# Patient Record
Sex: FEMALE | Race: BLACK OR AFRICAN AMERICAN | Employment: UNEMPLOYED | URBAN - METROPOLITAN AREA
[De-identification: names, ages, dates, MRNs, and addresses within clinical notes are randomized per-mention and may not be internally consistent; named-entity substitution may affect disease eponyms.]

---

## 2017-11-03 ENCOUNTER — APPOINTMENT (OUTPATIENT)
Dept: GENERAL RADIOLOGY | Age: 62
End: 2017-11-03
Attending: EMERGENCY MEDICINE
Payer: SELF-PAY

## 2017-11-03 ENCOUNTER — HOSPITAL ENCOUNTER (EMERGENCY)
Age: 62
Discharge: HOME | End: 2017-11-03
Attending: EMERGENCY MEDICINE
Payer: SELF-PAY

## 2017-11-03 VITALS
SYSTOLIC BLOOD PRESSURE: 157 MMHG | HEART RATE: 86 BPM | TEMPERATURE: 98 F | BODY MASS INDEX: 35.36 KG/M2 | RESPIRATION RATE: 17 BRPM | OXYGEN SATURATION: 100 % | DIASTOLIC BLOOD PRESSURE: 95 MMHG | HEIGHT: 66 IN | WEIGHT: 220 LBS

## 2017-11-03 DIAGNOSIS — S60.211A CONTUSION OF RIGHT WRIST, INITIAL ENCOUNTER: ICD-10-CM

## 2017-11-03 DIAGNOSIS — R73.9 HYPERGLYCEMIA: Primary | ICD-10-CM

## 2017-11-03 LAB — GLUCOSE BLD STRIP.AUTO-MCNC: 213 MG/DL (ref 70–110)

## 2017-11-03 PROCEDURE — 75810000053 HC SPLINT APPLICATION

## 2017-11-03 PROCEDURE — 73130 X-RAY EXAM OF HAND: CPT

## 2017-11-03 PROCEDURE — 82962 GLUCOSE BLOOD TEST: CPT

## 2017-11-03 PROCEDURE — 73110 X-RAY EXAM OF WRIST: CPT

## 2017-11-03 PROCEDURE — 74011250637 HC RX REV CODE- 250/637: Performed by: EMERGENCY MEDICINE

## 2017-11-03 PROCEDURE — 99283 EMERGENCY DEPT VISIT LOW MDM: CPT

## 2017-11-03 RX ORDER — HYDROCODONE BITARTRATE AND ACETAMINOPHEN 5; 325 MG/1; MG/1
1 TABLET ORAL
Status: COMPLETED | OUTPATIENT
Start: 2017-11-03 | End: 2017-11-03

## 2017-11-03 RX ADMIN — HYDROCODONE BITARTRATE AND ACETAMINOPHEN 1 TABLET: 5; 325 TABLET ORAL at 14:51

## 2017-11-03 NOTE — ED NOTES
I have reviewed discharge instructions with the patient. The patient verbalized understanding. Splint care reviewed. Follow-up reviewed. Patient armband removed and shredded.

## 2017-11-03 NOTE — ED PROVIDER NOTES
HPI Comments: 12:50 PM Reena Kellogg is a 60 y.o. female with h/o HTN who presents to ED complaining of right hand swelling after a mechanical fall about a week a go. The pt states she tripped and fell catching her self with her right hand and knees, since her hand has been swollen with pain. The pt reports she recently moved to the area from [place] so she does not have a PCP. The pt denies smoking, HA, fever, numbness, and weakness. The pt had no other complaints or concerns in the ED. PCP: No primary care provider on file. The history is provided by the patient. No  was used. Past Medical History:   Diagnosis Date    Diabetes ([facility] [address]     Hypertension        History reviewed. No pertinent surgical history. History reviewed. No pertinent family history. Social History     Social History    Marital status:      Spouse name: N/A    Number of children: N/A    Years of education: N/A     Occupational History    Not on file. Social History Main Topics    Smoking status: Never Smoker    Smokeless tobacco: Never Used    Alcohol use Not on file    Drug use: Not on file    Sexual activity: Not on file     Other Topics Concern    Not on file     Social History Narrative    No narrative on file         ALLERGIES: Review of patient's allergies indicates not on file. Review of Systems   Constitutional: Negative for chills and fatigue. HENT: Negative. Negative for sore throat. Eyes: Negative. Respiratory: Negative for cough and shortness of breath. Cardiovascular: Negative for chest pain and palpitations. Genitourinary: Negative for dysuria. Musculoskeletal: Positive for arthralgias, joint swelling and myalgias. Skin: Negative. Neurological: Negative for dizziness, weakness and light-headedness. Psychiatric/Behavioral: Negative. All other systems reviewed and are negative.       Vitals:    11/03/17 1249   BP: (!) 157/95 Pulse: 86   Resp: 17   Temp: 98 °F (36.7 °C)   SpO2: 100%   Weight: 99.8 kg (220 lb)   Height: 5' 6\" (1.676 m)            Physical Exam   Constitutional: She is oriented to person, place, and time. She appears well-developed and well-nourished. No distress. HENT:   Head: Normocephalic and atraumatic. Mouth/Throat: Oropharynx is clear and moist.   Eyes: Conjunctivae and EOM are normal. Pupils are equal, round, and reactive to light. No scleral icterus. Neck: Normal range of motion. Neck supple. Cardiovascular: Normal rate, regular rhythm and normal heart sounds. No murmur heard. Pulmonary/Chest: Effort normal and breath sounds normal. No respiratory distress. Abdominal: Soft. Bowel sounds are normal. She exhibits no distension. There is no tenderness. Musculoskeletal: She exhibits no edema. Right wrist: She exhibits tenderness and swelling. Left hand: She exhibits tenderness. The pt has right wrist and hand edema with her being extremely tender over her wrist. The pt entire hand is edematous. The pt has decreased flexion and extension. Pt also has snuff box tenderness. Lymphadenopathy:     She has no cervical adenopathy. Neurological: She is alert and oriented to person, place, and time. Coordination normal.   Skin: Skin is warm and dry. No rash noted. Psychiatric: She has a normal mood and affect. Her behavior is normal.   Nursing note and vitals reviewed.        MDM  Number of Diagnoses or Management Options  Contusion of right wrist, initial encounter:   Hyperglycemia:   Diagnosis management comments: Mechanical fall onto outstretched hand with edema/tenderness wrist and hand X 1 week     Xray no apparent fracture however with snuff box tenderness will place in thumbspica splint referral to Dr Ravinder Ly and/or Complexity of Data Reviewed  Tests in the radiology section of CPT®: ordered and reviewed      ED Course       Splint, Thumb Gutter  Date/Time: 11/3/2017 2:52 PM  Performed by: Patty Lee  Authorized by: Patty Lee     Consent:     Consent obtained:  Verbal    Consent given by:  Patient    Risks discussed:  Pain    Alternatives discussed:  No treatment  Pre-procedure details:     Sensation:  Normal  Procedure details:     Laterality:  Right    Location:  Wrist    Wrist:  R wrist    Strapping: no      Splint type:  Thumb spica    Supplies:  Ortho-Glass  Post-procedure details:     Pain:  Improved    Sensation:  Normal    Patient tolerance of procedure: Tolerated well, no immediate complications  Comments:      capillary refill intact. Vitals:  Patient Vitals for the past 12 hrs:   Temp Pulse Resp BP SpO2   11/03/17 1249 98 °F (36.7 °C) 86 17 (!) 157/95 100 %       Medications Ordered:  Medications   HYDROcodone-acetaminophen (NORCO) 5-325 mg per tablet 1 Tab (1 Tab Oral Given 11/3/17 1451)       Lab Findings:  Recent Results (from the past 12 hour(s))   GLUCOSE, POC    Collection Time: 11/03/17 12:47 PM   Result Value Ref Range    Glucose (POC) 213 (H) 70 - 110 mg/dL       EKG Interpretation by ED physician:      X-ray, CT or radiology findings or impressions:  XR HAND RT MIN 3 V   Final Result      XR WRIST RT AP/LAT/OBL MIN 3V    (Results Pending)       Progress notes, consult notes, or additional procedure notes:      Reevaluation of the patient:       Diagnosis:   1. Hyperglycemia    2.  Contusion of right wrist, initial encounter        Disposition: home    Follow-up Information     Follow up With Details Comments Contact formerly [facility] EMERGENCY DEPT  As needed, If symptoms worsen [address] [place] [address] [address] [facility]  for ED medical follow up appointment  [address] [facility] [address]     Rozina Pittman MD Schedule an appointment as soon as possible for a visit for ED follow up appointment  [address]  [address] [place] [address] Patient's Medications    No medications on file       Scribe Luh Hernandez acting as a scribe for and in the presence of Gildardo Valladares MD      November 03, 2017 at 12:57 PM       Provider Attestation:      I personally performed the services described in the documentation, reviewed the documentation, as recorded by the scribe in my presence, and it accurately and completely records my words and actions.  November 03, 2017 at 12:57 PM - Gildardo Valladares MD

## 2017-11-03 NOTE — ED TRIAGE NOTES
Dewanda Flurry one week ago catching self with right arm and knees. Right hand swollen and elbow and wrist pain.  Bilateral knee abrasions

## 2018-09-11 ENCOUNTER — HOSPITAL ENCOUNTER (EMERGENCY)
Age: 63
Discharge: ANOTHER HOSPITAL | End: 2018-09-11
Attending: EMERGENCY MEDICINE
Payer: SELF-PAY

## 2018-09-11 ENCOUNTER — APPOINTMENT (OUTPATIENT)
Dept: CT IMAGING | Age: 63
End: 2018-09-11
Attending: PHYSICIAN ASSISTANT
Payer: SELF-PAY

## 2018-09-11 VITALS
WEIGHT: 170 LBS | HEIGHT: 69 IN | TEMPERATURE: 98.1 F | RESPIRATION RATE: 16 BRPM | HEART RATE: 85 BPM | SYSTOLIC BLOOD PRESSURE: 145 MMHG | OXYGEN SATURATION: 98 % | DIASTOLIC BLOOD PRESSURE: 83 MMHG | BODY MASS INDEX: 25.18 KG/M2

## 2018-09-11 DIAGNOSIS — H53.131 ACUTE LOSS OF VISION, RIGHT: Primary | ICD-10-CM

## 2018-09-11 PROCEDURE — 99283 EMERGENCY DEPT VISIT LOW MDM: CPT

## 2018-09-11 PROCEDURE — 70450 CT HEAD/BRAIN W/O DYE: CPT

## 2018-09-11 RX ORDER — METFORMIN HYDROCHLORIDE 500 MG/1
TABLET ORAL 2 TIMES DAILY WITH MEALS
COMMUNITY

## 2018-09-11 NOTE — ED PROVIDER NOTES
EMERGENCY DEPARTMENT HISTORY AND PHYSICAL EXAM 
 
4:37 PM 
 
 
Date: 9/11/2018 Patient Name: Paola Diaz History of Presenting Illness Chief Complaint Patient presents with  Visual Problems History Provided By: Patient Chief Complaint: floating spots in eyes Duration:  Weeks Timing:  Constant Location: both eyes Quality: n/a Severity: Mild Modifying Factors: Nothing makes it worse or better Associated Symptoms: denies any other associated signs or symptoms Additional History (Context): Paola Diaz is a 58 y.o. female with diabetes and hypertension who presents with floating spots and waves in her eyes for the past several weeks. Denies visual changes or pain. States is Diabetic but has not gone to get her eyes checked. Denies any drainage or discomfort in the eye, denies eye pressure. Denies fever. Liyah Cosme PCP: PROVIDER UNKNOWN Current Outpatient Prescriptions Medication Sig Dispense Refill  metFORMIN (GLUCOPHAGE) 500 mg tablet Take  by mouth two (2) times daily (with meals). Past History Past Medical History: 
Past Medical History:  
Diagnosis Date  Diabetes ([facility] [address]  Hypertension Past Surgical History: 
History reviewed. No pertinent surgical history. Family History: 
History reviewed. No pertinent family history. Social History: 
Social History Substance Use Topics  Smoking status: Never Smoker  Smokeless tobacco: Never Used  Alcohol use None Allergies: 
No Known Allergies Review of Systems Constitutional:  Denies malaise, fever, chills. Head:  Denies injury. Face:  Denies injury or pain. ENMT: Eye spots  Denies sore throat. Neck:  Denies injury or pain. Chest:  Denies injury. Cardiac:  Denies chest pain or palpitations. Respiratory:  Denies cough, wheezing, difficulty breathing, shortness of breath. GI/ABD:  Denies injury, pain, distention, nausea, vomiting, diarrhea. :  Denies injury, pain, dysuria or urgency. Back:  Denies injury or pain. Pelvis:  Denies injury or pain. Extremity/MS:  Denies injury or pain. Neuro:  Denies headache, LOC, dizziness, neurologic symptoms/deficits/paresthesias. Skin: Denies injury, rash, itching or skin changes. Physical Exam  
 
Visit Vitals  /86 (BP 1 Location: Right arm, BP Patient Position: At rest)  Pulse 83  Temp 97.8 °F (36.6 °C)  Resp 15  Ht 5' 9\" (1.753 m)  Wt 77.1 kg (170 lb)  SpO2 100%  BMI 25.1 kg/m2 CONSTITUTIONAL: Alert, in no apparent distress; well-developed; well-nourished. HEAD:  Normocephalic, atraumatic. EYES: PERRL; EOM's intact. Conjunctiva not injected, cornea clear, Pt can follow shades with her right eye. ENTM: Nose: No rhinorrhea; Throat: mucous membranes moist. Posterior pharynx-normal. 
Neck:  No JVD, supple without lymphadenopathy. RESP: Chest clear, equal breath sounds. CV: S1 and S2 WNL; No murmurs, gallops or rubs. UPPER EXT:  Normal inspection. LOWER EXT: Normal inspection. NEURO: strength 5/5 and sym, sensation intact. SKIN: No rashes; Normal for age and stage. PSYCH:  Alert and oriented, normal affect. Diagnostic Study Results Labs - No results found for this or any previous visit (from the past 12 hour(s)). Radiologic Studies -  
CT HEAD WO CONT Final Result Medical Decision Making I am the first provider for this patient. I reviewed the vital signs, available nursing notes, past medical history, past surgical history, family history and social history. Vital Signs-Reviewed the patient's vital signs. Pulse Oximetry Analysis -  100 on room air (Interpretation)wnl Records Reviewed: Nursing Notes (Time of Review: 4:37 PM) ED Course: Progress Notes, Reevaluation, and Consults: 
 
Provider Notes (Medical Decision Making):  
 
4:44 PM 
 Pt now states that she has not been able to see out of her right eye for the past 2 days. Sattes she misunderstood when I asked about visual changes. 5:19 PM 
Consulted with GUILLE Grullon  concerning patient Perla Olson, standard discussion of reason for visit, HPI, ROS, PE, and current results available. Report was given at this time and pt was turned over to the provider above, who will assume care of pt at this time and disposition. GUILLE Villanueva  
 
CT Results (most recent): 
 
Results from [facility] Encounter encounter on 09/11/18 CT HEAD WO CONT Narrative EXAMINATION:  CT head noncontrast 
 
COMPARISON: None HISTORY: Vision loss, seeing spots TECHNIQUE: Noncontrasted axial images were obtained through the patient's brain 
from the vertex of the skull through the skull base. Bone and soft tissue 
windows were reviewed. One or more dose reduction techniques were used on this CT: automated exposure 
control, adjustment of the mAs and/or kVp according to patient's size, and 
iterative reconstruction techniques. The specific techniques utilized on this CT 
exam have been documented in the patient's electronic medical record. FINDINGS: Brain architecture is normal. No mass effect, midline shift or 
hemorrhage. Ventricles are normal in size, position and configuration. No 
abnormal extra-axial fluid collections are seen. No territorial signs of 
infarct. The bony calvarium appears intact without acute displaced fracture. The 
visualized paranasal sinuses and mastoid air cells are aerated. Impression IMPRESSION: 
 
1. No acute intracranial pathology identified. 6:15 PM  Reevaluated patient. Pt states she had worsening floaters in her right eye for the past 3-4 weeks but over the 2 days she feels as if 'spiderwebs' are covering her right eye.   Pt states she was unable to see the eye chart but can see but blurry at very close distance (12 inches) She denies eye pain. Discussed with Dr Jose Lisa who encourages transfer to [facility] for Ophthalmology evaluation Consult:   
6:45 PM  
Discussed care with Dr Ochoa Goldstein ED. Standard discussion; including history of patients chief complaint, available diagnostic results, and treatment course. PLAN:Dr Nathan Lobo agrees to accept pt at [facility]. Diagnosis Clinical Impression: 1. Acute loss of vision, right   
 
_______________________________

## 2018-09-12 NOTE — ED NOTES
Patient being transferred to [facility] for continued care for opthalmology evaluation.

## 2020-02-06 NOTE — DISCHARGE INSTRUCTIONS
Learning About High Blood Sugar  What is high blood sugar? Your body turns the food you eat into glucose (sugar), which it uses for energy. But if your body isn't able to use the sugar right away, it can build up in your blood and lead to high blood sugar. When the amount of sugar in your blood stays too high for too much of the time, you may have diabetes. Diabetes is a disease that can cause serious health problems. The good news is that lifestyle changes may help you get your blood sugar back to normal and avoid or delay diabetes. What causes high blood sugar? Sugar (glucose) can build up in your blood if you:  · Are overweight. · Have a family history of diabetes. · Take certain medicines, such as steroids. What are the symptoms? Having high blood sugar may not cause any symptoms at all. Or it may make you feel very thirsty or very hungry. You may also urinate more often than usual, have blurry vision, or lose weight without trying. How is high blood sugar treated? You can take steps to lower your blood sugar level if you understand what makes it get higher. Your doctor may want you to learn how to test your blood sugar level at home. Then you can see how illness, stress, or different kinds of food or medicine raise or lower your blood sugar level. Other tests may be needed to see if you have diabetes. How can you prevent high blood sugar? · Watch your weight. If you're overweight, losing just a small amount of weight may help. Reducing fat around your waist is most important. · Limit the amount of calories, sweets, and unhealthy fat you eat. Ask your doctor if a dietitian can help you. A registered dietitian can help you create meal plans that fit your lifestyle. · Get at least 30 minutes of exercise on most days of the week. Exercise helps control your blood sugar. It also helps you maintain a healthy weight. Walking is a good choice.  You also may want to do other activities, such as [Takes medication as prescribed] : takes running, swimming, cycling, or playing tennis or team sports. · If your doctor prescribed medicines, take them exactly as prescribed. Call your doctor if you think you are having a problem with your medicine. You will get more details on the specific medicines your doctor prescribes. Follow-up care is a key part of your treatment and safety. Be sure to make and go to all appointments, and call your doctor if you are having problems. It's also a good idea to know your test results and keep a list of the medicines you take. Where can you learn more? Go to http://mani-channing.info/. Enter O108 in the search box to learn more about \"Learning About High Blood Sugar. \"  Current as of: March 13, 2017  Content Version: 11.4  © 6185-4381 [employer]. Care instructions adapted under license by [employer] (which disclaims liability or warranty for this information). If you have questions about a medical condition or this instruction, always ask your healthcare professional. [address] any warranty or liability for your use of this information. [None] : Patient does not have any barriers to medication adherence

## 2021-04-22 ENCOUNTER — HOSPITAL ENCOUNTER (OUTPATIENT)
Dept: LAB | Age: 66
Discharge: HOME | End: 2021-04-22
Payer: MEDICARE

## 2021-04-22 LAB
ALBUMIN SERPL-MCNC: 4 G/DL (ref 3.4–5)
ALBUMIN/GLOB SERPL: 0.9 {RATIO} (ref 0.8–1.7)
ALP SERPL-CCNC: 71 U/L (ref 45–117)
ALT SERPL-CCNC: 20 U/L (ref 13–56)
ANION GAP SERPL CALC-SCNC: 9 MMOL/L (ref 3–18)
AST SERPL-CCNC: 17 U/L (ref 10–38)
BILIRUB SERPL-MCNC: 0.5 MG/DL (ref 0.2–1)
BUN SERPL-MCNC: 22 MG/DL (ref 7–18)
BUN/CREAT SERPL: 29 (ref 12–20)
CALCIUM SERPL-MCNC: 9.6 MG/DL (ref 8.5–10.1)
CHLORIDE SERPL-SCNC: 109 MMOL/L (ref 100–111)
CO2 SERPL-SCNC: 25 MMOL/L (ref 21–32)
CREAT SERPL-MCNC: 0.76 MG/DL (ref 0.6–1.3)
GLOBULIN SER CALC-MCNC: 4.7 G/DL (ref 2–4)
GLUCOSE SERPL-MCNC: 106 MG/DL (ref 74–99)
INR PPP: 1.1 (ref 0.8–1.2)
POTASSIUM SERPL-SCNC: 4.1 MMOL/L (ref 3.5–5.5)
PROT SERPL-MCNC: 8.7 G/DL (ref 6.4–8.2)
PROTHROMBIN TIME: 14 SEC (ref 11.5–15.2)
SODIUM SERPL-SCNC: 143 MMOL/L (ref 136–145)

## 2021-04-22 PROCEDURE — 80307 DRUG TEST PRSMV CHEM ANLYZR: CPT

## 2021-04-22 PROCEDURE — 80053 COMPREHEN METABOLIC PANEL: CPT

## 2021-04-22 PROCEDURE — 87522 HEPATITIS C REVRS TRNSCRPJ: CPT

## 2021-04-22 PROCEDURE — 85027 COMPLETE CBC AUTOMATED: CPT

## 2021-04-22 PROCEDURE — 87902 NFCT AGT GNTYP ALYS HEP C: CPT

## 2021-04-22 PROCEDURE — 85610 PROTHROMBIN TIME: CPT

## 2021-04-22 PROCEDURE — 81596 NFCT DS CHRNC HCV 6 ASSAYS: CPT

## 2021-04-22 PROCEDURE — 36415 COLL VENOUS BLD VENIPUNCTURE: CPT

## 2021-04-23 LAB
AMPHETAMINE SCRN, URINE, 714816: NORMAL NG/ML
AMPHETAMINES UR QL SCN: NEGATIVE NG/ML
BARBITURATES UR QL SCN: NEGATIVE NG/ML
BENZODIAZ UR QL: NEGATIVE NG/ML
BZE UR QL: NEGATIVE NG/ML
CANNABINOIDS UR QL SCN: NEGATIVE NG/ML
METHADONE UR QL SCN: NEGATIVE NG/ML
OPIATES, 714820: NEGATIVE NG/ML
PCP UR QL: NEGATIVE NG/ML
PROPOXYPH UR QL: NEGATIVE NG/ML

## 2021-04-24 LAB
A2 MACROGLOB SERPL-MCNC: 335 MG/DL (ref 110–276)
ALT (SGPT) P5P, 001547: 16 IU/L (ref 0–40)
APO A-I SERPL-MCNC: 160 MG/DL (ref 116–209)
BILIRUB SERPL-MCNC: 0.3 MG/DL (ref 0–1.2)
COMMENT, 550127: ABNORMAL
FIBROSIS SCORE, 550102, HCVF1: 0.41 (ref 0–0.21)
FIBROSIS SCORING:, 550107: ABNORMAL
FIBROSIS STAGE, 550132: ABNORMAL
GGT SERPL-CCNC: 24 IU/L (ref 0–60)
HAPTOGLOB SERPL-MCNC: 73 MG/DL (ref 37–355)
HCV GENOTYPE: NORMAL
HCV GENTYP SERPL NAA+PROBE: NORMAL
HCV RNA SERPL NAA+PROBE-ACNC: NORMAL IU/ML
HCV RNA SERPL NAA+PROBE-LOG IU: 5.92 LOG10 IU/ML
INTERPRETATION, 550106: ABNORMAL
LIMITATIONS, 550105: ABNORMAL
NECROINFLAMM ACTIVITY SCORING:, 550121: ABNORMAL
NECROINFLAMMAT ACTIVITY GRADE, 550133: ABNORMAL
NECROINFLAMMAT ACTIVITY SCORE, 550103: 0.07 (ref 0–0.17)
PLEASE NOTE, 550474: NORMAL
TEST INFORMATION, 550045: NORMAL

## 2021-04-29 LAB
ERYTHROCYTE [DISTWIDTH] IN BLOOD BY AUTOMATED COUNT: 12.6 % (ref 11.6–14.5)
HCT VFR BLD AUTO: 40.3 % (ref 35–45)
HGB BLD-MCNC: 12.9 G/DL (ref 12–16)
MCH RBC QN AUTO: 28.3 PG (ref 24–34)
MCHC RBC AUTO-ENTMCNC: 32 G/DL (ref 31–37)
MCV RBC AUTO: 88.4 FL (ref 80–99)
PLATELET # BLD AUTO: 298 K/UL (ref 135–420)
PMV BLD AUTO: 11.3 FL (ref 9.2–11.8)
RBC # BLD AUTO: 4.56 M/UL (ref 4.2–5.3)
WBC # BLD AUTO: 12.7 K/UL (ref 4.6–13.2)